# Patient Record
Sex: FEMALE | Race: WHITE | Employment: FULL TIME | URBAN - METROPOLITAN AREA
[De-identification: names, ages, dates, MRNs, and addresses within clinical notes are randomized per-mention and may not be internally consistent; named-entity substitution may affect disease eponyms.]

---

## 2018-01-08 ENCOUNTER — TRANSFERRED RECORDS (OUTPATIENT)
Dept: HEALTH INFORMATION MANAGEMENT | Facility: CLINIC | Age: 28
End: 2018-01-08

## 2018-01-08 LAB — PAP-ABSTRACT: NORMAL

## 2019-11-27 ENCOUNTER — OFFICE VISIT (OUTPATIENT)
Dept: OBGYN | Facility: CLINIC | Age: 29
End: 2019-11-27
Payer: COMMERCIAL

## 2019-11-27 VITALS
SYSTOLIC BLOOD PRESSURE: 124 MMHG | DIASTOLIC BLOOD PRESSURE: 79 MMHG | HEART RATE: 98 BPM | HEIGHT: 69 IN | BODY MASS INDEX: 20.14 KG/M2 | WEIGHT: 136 LBS | TEMPERATURE: 98.2 F

## 2019-11-27 DIAGNOSIS — Z00.00 ANNUAL PHYSICAL EXAM: Primary | ICD-10-CM

## 2019-11-27 DIAGNOSIS — Z82.49 FAMILY HISTORY OF ISCHEMIC HEART DISEASE: ICD-10-CM

## 2019-11-27 LAB
CHOLEST SERPL-MCNC: 168 MG/DL
HDLC SERPL-MCNC: 65 MG/DL
LDLC SERPL CALC-MCNC: 92 MG/DL
NONHDLC SERPL-MCNC: 103 MG/DL
TRIGL SERPL-MCNC: 57 MG/DL
TSH SERPL DL<=0.005 MIU/L-ACNC: 1.76 MU/L (ref 0.4–4)

## 2019-11-27 PROCEDURE — 36415 COLL VENOUS BLD VENIPUNCTURE: CPT | Performed by: OBSTETRICS & GYNECOLOGY

## 2019-11-27 PROCEDURE — 84443 ASSAY THYROID STIM HORMONE: CPT | Performed by: OBSTETRICS & GYNECOLOGY

## 2019-11-27 PROCEDURE — 82306 VITAMIN D 25 HYDROXY: CPT | Performed by: OBSTETRICS & GYNECOLOGY

## 2019-11-27 PROCEDURE — 80061 LIPID PANEL: CPT | Performed by: OBSTETRICS & GYNECOLOGY

## 2019-11-27 PROCEDURE — 99385 PREV VISIT NEW AGE 18-39: CPT | Performed by: OBSTETRICS & GYNECOLOGY

## 2019-11-27 RX ORDER — CHOLECALCIFEROL (VITAMIN D3) 50 MCG
1 TABLET ORAL DAILY
COMMUNITY

## 2019-11-27 SDOH — HEALTH STABILITY: MENTAL HEALTH: HOW OFTEN DO YOU HAVE A DRINK CONTAINING ALCOHOL?: 2-4 TIMES A MONTH

## 2019-11-27 SDOH — HEALTH STABILITY: MENTAL HEALTH: HOW MANY STANDARD DRINKS CONTAINING ALCOHOL DO YOU HAVE ON A TYPICAL DAY?: 1 OR 2

## 2019-11-27 SDOH — HEALTH STABILITY: MENTAL HEALTH: HOW OFTEN DO YOU HAVE 6 OR MORE DRINKS ON ONE OCCASION?: NEVER

## 2019-11-27 ASSESSMENT — MIFFLIN-ST. JEOR: SCORE: 1393.33

## 2019-11-27 NOTE — PROGRESS NOTES
Agnes is a 29 year old  female who presents for annual exam.     Menses are regular q 28-30 days and normal lasting 3-5 days.  Menses flow: normal.  Patient's last menstrual period was 2019.. Using none for contraception.  She is not currently considering pregnancy.  Besides routine health maintenance,  she would like to discuss discuss family history of heart disease  Pat cousin, pat aunt, pat grandmother and dad have heart disease.  Also have high cholesterol.  Cousin had heart attack when she was 17; she is unsure if they identified a familial/genetic cause for this.  Aunt had MI at around 41yo.  Dad has not had MI.  Pat GM passed away when patient was a baby, but that was due to suicide.  GYNECOLOGIC HISTORY:  Menarche: 11  Age at first intercourse: 17 Number of lifetime partners: more than 6  Agnes is sexually active with female partner(s) and is currently in monogamous relationship with girlfriend.    History sexually transmitted infections:No STD history  STI testing offered?  Declined  YAKELIN exposure: No  History of abnormal Pap smear: NO - age 29- 64 PAP every 3 years recommended  Family history of breast CA: No  Family history of uterine/ovarian CA: No    Family history of colon CA: No    HEALTH MAINTENANCE:  Cholesterol: (No results found for: CHOL History of abnormal lipids: No  Mammo: 0 . History of abnormal Mammo: YES, No, Not applicable.  Regular Self Breast Exams: No  Calcium/Vitamin D intake: source:  dairy Adequate? Yes  TSH: (No results found for: TSH )  Pap; (No results found for: PAP )    HISTORY:  OB History    Para Term  AB Living   0 0 0 0 0 0   SAB TAB Ectopic Multiple Live Births   0 0 0 0 0     Past Medical History:   Diagnosis Date     Intermittent asthma without complication      Seasonal affective disorder (H)      Past Surgical History:   Procedure Laterality Date     wisdom teeth removed        Family History   Problem Relation Age of Onset     Arthritis  Mother      Alcoholism Father      Depression Father      Hypertension Father      Coronary Artery Disease Father      Hyperlipidemia Father      Heart Disease Paternal Grandmother      Hyperlipidemia Paternal Grandmother      Myocardial Infarction Paternal Cousin 17     Myocardial Infarction Paternal Aunt         in 40s     Social History     Socioeconomic History     Marital status: Single     Spouse name: None     Number of children: None     Years of education: None     Highest education level: None   Occupational History     None   Social Needs     Financial resource strain: None     Food insecurity:     Worry: None     Inability: None     Transportation needs:     Medical: None     Non-medical: None   Tobacco Use     Smoking status: Never Smoker     Smokeless tobacco: Never Used   Substance and Sexual Activity     Alcohol use: Yes     Frequency: 2-4 times a month     Drinks per session: 1 or 2     Binge frequency: Never     Drug use: Never     Sexual activity: Yes     Partners: Female   Lifestyle     Physical activity:     Days per week: None     Minutes per session: None     Stress: None   Relationships     Social connections:     Talks on phone: None     Gets together: None     Attends [employer] service: None     Active member of club or organization: None     Attends meetings of clubs or organizations: None     Relationship status: None     Intimate partner violence:     Fear of current or ex partner: None     Emotionally abused: None     Physically abused: None     Forced sexual activity: None   Other Topics Concern     None   Social History Narrative     None       Current Outpatient Medications:      vitamin D3 (CHOLECALCIFEROL) 2000 units (50 mcg) tablet, Take 1 tablet by mouth daily, Disp: , Rfl:    No Known Allergies    Past medical, surgical, social and family history were reviewed and updated in EPIC.    ROS:   C:     NEGATIVE for fever, chills, change in weight  I:       NEGATIVE for worrisome  "rashes, moles or lesions  E:     NEGATIVE for vision changes or irritation  E/M: NEGATIVE for ear, mouth and throat problems  R:     NEGATIVE for significant cough or SOB  CV:   NEGATIVE for chest pain, palpitations or peripheral edema  GI:     NEGATIVE for nausea, abdominal pain, heartburn, or change in bowel habits  :   NEGATIVE for frequency, dysuria, hematuria, vaginal discharge, or irregular bleeding  M:     NEGATIVE for significant arthralgias or myalgia  N:      NEGATIVE for weakness, dizziness or paresthesias  E:      NEGATIVE for temperature intolerance, skin/hair changes  P:      NEGATIVE for changes in mood or affect.    EXAM:  /79 (BP Location: Left arm, Patient Position: Sitting, Cuff Size: Adult Regular)   Pulse 98   Temp 98.2  F (36.8  C) (Oral)   Ht 1.74 m (5' 8.5\")   Wt 61.7 kg (136 lb)   LMP 11/22/2019   BMI 20.38 kg/m     BMI: Body mass index is 20.38 kg/m .  Constitutional: healthy, alert and no distress  Head: Normocephalic. No masses, lesions, tenderness or abnormalities  Neck: Neck supple. Trachea midline. No adenopathy. Thyroid symmetric, normal size.   Cardiovascular: RRR.   Respiratory: Negative.   Breast: No nodularity, asymmetry or nipple discharge bilaterally.  Gastrointestinal: Abdomen soft, non-tender, non-distended. No masses, organomegaly.  :  Vulva:  No external lesions, normal female hair distribution, no inguinal adenopathy.    Urethra:  Midline, non-tender, well supported, no discharge  Further exam was declined by patient today due to menstruation/menstrual cup use  Rectal Exam: deferred  Musculoskeletal: extremities normal  Skin: no suspicious lesions or rashes  Psychiatric: Affect appropriate, cooperative,mentation appears normal.     COUNSELING:   Reviewed preventive health counseling, as reflected in patient instructions   reports that she has never smoked. She has never used smokeless tobacco.    Body mass index is 20.38 kg/m .    FRAX Risk " Assessment    ASSESSMENT:  29 year old female with satisfactory annual exam  (Z00.00) Annual physical exam  (primary encounter diagnosis)  Comment: Pap UTD.  Routine labs.  Not fasting, so did not order glucose.  Consider next annual  Plan: TSH with free T4 reflex, Vitamin D Deficiency            (Z82.49) Family history of ischemic heart disease  Comment: She will talk with her family, particularly her cousin who reportedly had MI at age 17.  She will send me a [employer] message when she has more information to determine if and what referral is necessary.  Will check lipid panel today to evaluate for high cholesterol, which is also significant in her family history and could be related  Plan: Lipid Profile    RTC 1yr or prn.    Agnes Victoria MD

## 2019-11-27 NOTE — NURSING NOTE
Chief Complaint   Patient presents with     Physical       Initial /79 (BP Location: Left arm, Patient Position: Sitting, Cuff Size: Adult Regular)   Pulse 98   Temp 98.2  F (36.8  C) (Oral)   Wt 61.7 kg (136 lb)   LMP 2019  There is no height or weight on file to calculate BMI.  BP completed using cuff size: regular    Questioned patient about current smoking habits.  Pt. has never smoked.          The following HM Due: NONE      The following patient reported/Care Every where data was sent to:  P ABSTRACT QUALITY INITIATIVES [[id number]]  Pap smear done on this date: 2018 (approximately), by this group: [facility], results were Neg.       Yuko Hwang MA

## 2019-11-27 NOTE — Clinical Note
Please abstract the following data from this visit with this patient into the appropriate field in Epic:Tests that can be patient reported without a hard copy:Pap smear done on this date: 1/8/18 (approximately), by this group: [facility], results were NIL.  In Care Everywhere Other Tests found in the patient's chart through Chart Review/Care Everywhere:{Abstract Quality List (Optional):999863}Note to Abstraction: If this section is blank, no results were found via Chart Review/Care Everywhere.

## 2019-11-27 NOTE — Clinical Note
Please abstract the following data from this visit with this patient into the appropriate field in Epic:Tests that can be patient reported without a hard copy:Pap smear done on this date: 1/8/2018 (approximately), by this group: [facility], results were Neg. Other Tests found in the patient's chart through Chart Review/Care Everywhere:{Abstract Quality List (Optional):842614}Note to Abstraction: If this section is blank, no results were found via Chart Review/Care Everywhere.

## 2019-11-29 LAB — DEPRECATED CALCIDIOL+CALCIFEROL SERPL-MC: 23 UG/L (ref 20–75)

## 2020-03-11 ENCOUNTER — HEALTH MAINTENANCE LETTER (OUTPATIENT)
Age: 30
End: 2020-03-11

## 2021-01-03 ENCOUNTER — HEALTH MAINTENANCE LETTER (OUTPATIENT)
Age: 31
End: 2021-01-03

## 2021-04-25 ENCOUNTER — HEALTH MAINTENANCE LETTER (OUTPATIENT)
Age: 31
End: 2021-04-25

## 2021-10-10 ENCOUNTER — HEALTH MAINTENANCE LETTER (OUTPATIENT)
Age: 31
End: 2021-10-10

## 2022-01-30 ENCOUNTER — HEALTH MAINTENANCE LETTER (OUTPATIENT)
Age: 32
End: 2022-01-30

## 2022-09-18 ENCOUNTER — HEALTH MAINTENANCE LETTER (OUTPATIENT)
Age: 32
End: 2022-09-18

## 2023-05-07 ENCOUNTER — HEALTH MAINTENANCE LETTER (OUTPATIENT)
Age: 33
End: 2023-05-07

## 2025-06-28 ENCOUNTER — OFFICE VISIT (OUTPATIENT)
Dept: URGENT CARE | Facility: URGENT CARE | Age: 35
End: 2025-06-28
Payer: COMMERCIAL

## 2025-06-28 VITALS
SYSTOLIC BLOOD PRESSURE: 136 MMHG | TEMPERATURE: 97.2 F | WEIGHT: 150 LBS | HEART RATE: 68 BPM | RESPIRATION RATE: 16 BRPM | HEIGHT: 69 IN | OXYGEN SATURATION: 100 % | DIASTOLIC BLOOD PRESSURE: 94 MMHG | BODY MASS INDEX: 22.22 KG/M2

## 2025-06-28 DIAGNOSIS — M79.89 LEG SWELLING: Primary | ICD-10-CM

## 2025-06-28 LAB — D DIMER PPP FEU-MCNC: <0.27 UG/ML FEU (ref 0–0.5)

## 2025-06-28 PROCEDURE — 36415 COLL VENOUS BLD VENIPUNCTURE: CPT | Performed by: FAMILY MEDICINE

## 2025-06-28 PROCEDURE — 1125F AMNT PAIN NOTED PAIN PRSNT: CPT | Performed by: FAMILY MEDICINE

## 2025-06-28 PROCEDURE — 85379 FIBRIN DEGRADATION QUANT: CPT | Performed by: FAMILY MEDICINE

## 2025-06-28 PROCEDURE — 3075F SYST BP GE 130 - 139MM HG: CPT | Performed by: FAMILY MEDICINE

## 2025-06-28 PROCEDURE — 3080F DIAST BP >= 90 MM HG: CPT | Performed by: FAMILY MEDICINE

## 2025-06-28 PROCEDURE — 99204 OFFICE O/P NEW MOD 45 MIN: CPT | Performed by: FAMILY MEDICINE

## 2025-06-28 ASSESSMENT — PAIN SCALES - GENERAL: PAINLEVEL_OUTOF10: MILD PAIN (3)

## 2025-06-28 NOTE — PROGRESS NOTES
"Assessment & Plan     Leg swelling  - D dimer, quantitative  - D dimer, quantitative     Through shared decision making and patient's concerns after discussing with her nurse friend on potential for there to be a blood clot we we will proceed with a D-dimer we discussed if the test is negative it is generally reliable if the test were to be elevated she understands this is a inconclusive test which may require further workup    Test returned negative    Suspect mild MSK injury, no obvious suggestion of tear; MSK exam as documented below    45 minutes spent on the date of the encounter doing chart review, history and exam, documentation and further activities per the note.       Peter Domínguez MD   [place] [facility]    Darnell Alarcon is a 34 year old female who presents to clinic today for the following health issues:  Chief Complaint   Patient presents with    Urgent Care     Pt in clinic c/o left lower leg bruising.  Pt is concerned it might be a DVT.     HPI    Patient woke up with left lower leg bruising on the backside there was no injuries or intense workouts or trauma to this area sustained.  No difficulty with walking.    Not on estrogen therapy. Non- smoker  No personal or FH of blood clots    Denies shortness of breath, chest discomfort    There are no active problems to display for this patient.      Current Outpatient Medications   Medication Sig Dispense Refill    vitamin D3 (CHOLECALCIFEROL) 2000 units (50 mcg) tablet Take 1 tablet by mouth daily       No current facility-administered medications for this visit.           Objective    BP (!) 136/94   Pulse 68   Temp 97.2  F (36.2  C) (Temporal)   Resp 16   Ht 1.753 m (5' 9\")   Wt 68 kg (150 lb)   LMP 06/28/2025   SpO2 100%   BMI 22.15 kg/m    Physical Exam   As noted above and including:   GEN: NAD  Inferior to the knee joint posterior side there is a section of bruising approximately 3 x 1.5 inches with mild swelling and " tenderness to the palpation  Patient has no limitations in function with plantarflexion or dorsiflexion or leg flexion or leg extension    Results for orders placed or performed in visit on 06/28/25   D dimer, quantitative     Status: Normal   Result Value Ref Range    D-Dimer Quantitative <0.27 0.00 - 0.50 ug/mL FEU    Narrative    This D-dimer assay is intended for use in conjunction with a clinical pretest probability assessment model to exclude pulmonary embolism (PE) and deep venous thrombosis (DVT) in outpatients suspected of PE or DVT. The cut-off value is 0.50 ug/mL FEU.                     The use of [email or web address] dictation services may have been used to construct the content in this note; any grammatical or spelling errors are non-intentional. Please contact the author of this note directly if you are in need of any clarification.

## 2025-08-30 ENCOUNTER — HEALTH MAINTENANCE LETTER (OUTPATIENT)
Age: 35
End: 2025-08-30